# Patient Record
Sex: FEMALE | Race: WHITE | Employment: FULL TIME | URBAN - METROPOLITAN AREA
[De-identification: names, ages, dates, MRNs, and addresses within clinical notes are randomized per-mention and may not be internally consistent; named-entity substitution may affect disease eponyms.]

---

## 2020-02-28 ENCOUNTER — HOSPITAL ENCOUNTER (OUTPATIENT)
Dept: MAMMOGRAPHY | Age: 45
Discharge: HOME | End: 2020-02-28
Attending: OBSTETRICS & GYNECOLOGY
Payer: COMMERCIAL

## 2020-02-28 DIAGNOSIS — N63.0 BREAST LUMP: ICD-10-CM

## 2020-02-28 PROCEDURE — 76642 ULTRASOUND BREAST LIMITED: CPT

## 2020-02-28 PROCEDURE — 77066 DX MAMMO INCL CAD BI: CPT

## 2020-03-20 ENCOUNTER — HOSPITAL ENCOUNTER (OUTPATIENT)
Dept: MAMMOGRAPHY | Age: 45
Discharge: HOME | End: 2020-03-20
Attending: OBSTETRICS & GYNECOLOGY
Payer: COMMERCIAL

## 2020-03-20 DIAGNOSIS — N63.20 BREAST MASS, LEFT: ICD-10-CM

## 2020-03-20 DIAGNOSIS — N63.0 BREAST MASS SEEN ON MAMMOGRAM: ICD-10-CM

## 2020-03-20 DIAGNOSIS — N64.53 NIPPLE RETRACTION: ICD-10-CM

## 2020-03-20 PROCEDURE — 88305 TISSUE EXAM BY PATHOLOGIST: CPT

## 2020-03-20 PROCEDURE — 77065 DX MAMMO INCL CAD UNI: CPT

## 2020-03-20 PROCEDURE — 19083 BX BREAST 1ST LESION US IMAG: CPT

## 2020-03-20 PROCEDURE — 74011000250 HC RX REV CODE- 250: Performed by: RADIOLOGY

## 2020-03-20 RX ORDER — SODIUM BICARBONATE 42 MG/ML
5 INJECTION, SOLUTION INTRAVENOUS
Status: COMPLETED | OUTPATIENT
Start: 2020-03-20 | End: 2020-03-20

## 2020-03-20 RX ORDER — LIDOCAINE HYDROCHLORIDE AND EPINEPHRINE 10; 10 MG/ML; UG/ML
8 INJECTION, SOLUTION INFILTRATION; PERINEURAL ONCE
Status: COMPLETED | OUTPATIENT
Start: 2020-03-20 | End: 2020-03-20

## 2020-03-20 RX ORDER — LIDOCAINE HYDROCHLORIDE 10 MG/ML
12 INJECTION INFILTRATION; PERINEURAL
Status: COMPLETED | OUTPATIENT
Start: 2020-03-20 | End: 2020-03-20

## 2020-03-20 RX ADMIN — LIDOCAINE HYDROCHLORIDE 12 ML: 10 INJECTION, SOLUTION INFILTRATION; PERINEURAL at 14:00

## 2020-03-20 RX ADMIN — SODIUM BICARBONATE 210 MG: 42 SOLUTION INTRAVENOUS at 14:00

## 2020-03-20 RX ADMIN — LIDOCAINE HYDROCHLORIDE AND EPINEPHRINE 80 MG: 10; 10 INJECTION, SOLUTION INFILTRATION; PERINEURAL at 14:00

## 2020-03-20 NOTE — PROGRESS NOTES
Patient received for left breast ultrasound guided biopsy for mass seen on ultrasound. Procedure explained to patient as well as risks associated with procedure. Post biopsy discharge instructions reviewed with patient and patient prefers to be contacted by phone with results of pathology.

## 2020-03-20 NOTE — PROGRESS NOTES
Patient tolerated procedure well. Pressure held to site. Minimal bleeding noted. Patient sent for post clip mammogram.  Dressing remained dry and intact. Post biopsy discharge instructions reviewed with patient and patient given a copy. Ice pack applied over transparent dressing. Patient discharged.

## 2020-03-24 NOTE — PROGRESS NOTES
Pathology results in and reviewed by Dr ARGUELLO [facility] and faxed to Dr Sara Prabhakar.   Patient contacted with pathology result and recommendation of yearly screening mammogram.